# Patient Record
Sex: MALE | Race: WHITE | URBAN - METROPOLITAN AREA
[De-identification: names, ages, dates, MRNs, and addresses within clinical notes are randomized per-mention and may not be internally consistent; named-entity substitution may affect disease eponyms.]

---

## 2017-04-10 ENCOUNTER — OFFICE VISIT (OUTPATIENT)
Dept: INTERNAL MEDICINE CLINIC | Facility: CLINIC | Age: 52
End: 2017-04-10

## 2017-04-10 VITALS
BODY MASS INDEX: 24.77 KG/M2 | DIASTOLIC BLOOD PRESSURE: 84 MMHG | SYSTOLIC BLOOD PRESSURE: 122 MMHG | HEART RATE: 73 BPM | WEIGHT: 193 LBS | HEIGHT: 74 IN | TEMPERATURE: 97 F

## 2017-04-10 DIAGNOSIS — Z12.11 SCREENING FOR COLON CANCER: ICD-10-CM

## 2017-04-10 DIAGNOSIS — Z76.89 ESTABLISHING CARE WITH NEW DOCTOR, ENCOUNTER FOR: Primary | ICD-10-CM

## 2017-04-10 PROCEDURE — 99214 OFFICE O/P EST MOD 30 MIN: CPT | Performed by: INTERNAL MEDICINE

## 2017-04-10 PROCEDURE — 99212 OFFICE O/P EST SF 10 MIN: CPT | Performed by: INTERNAL MEDICINE

## 2021-07-12 NOTE — PROGRESS NOTES
HPI:    Patient ID: Romy Benítez is a 45year old male. HPI He came in today to establish care with new physician. He recently came back from [place] , and his wife was diagnosed with e coli , he wanted to be checked. He denies any diarrhea, no nausea • Lipid screening 02-          Past Surgical History    INNER EAR SURGERY PROC UNLISTED        Family History   Problem Relation Age of Onset   • Hypertension Mother    • Thyroid Disorder Mother    • Cancer Brother       Social History:   Smoking distension and no mass. There is no hepatosplenomegaly. There is no tenderness. There is no rigidity, no rebound, no guarding and no CVA tenderness. Musculoskeletal: Normal range of motion. He exhibits no edema.    Lymphadenopathy:     He has no cervical 1.64

## (undated) NOTE — MR AVS SNAPSHOT
[address] [facility] [address]  [phone]               Thank you for choosing us for your health care visit with Kelsey Harmon MD.  We are glad to serve you and happy to provide you with this summary of your visi [facility] L[facility] [[id number] CUSTOM]  Order #:  [id number]         **REFERRAL REQUEST**    Your physician has referred you to a specialist.  Your physician or the clinic staff will provide you with the phone number you should call to schedule your appointmen Instructions and Information about Your Health      Aerobic Exercise for a Healthy Heart  Exercise is a lot more than an energy booster and a stress reliever.  It also strengthens your heart muscle, lowers your blood pressure and cholesterol, and burns sonia This list is accurate as of: 4/10/17 11:53 AM.  Always use your most recent med list.                aspirin 81 MG Tabs   Take 81 mg by mouth daily.                    Results of Recent Testing       MyChart     Visit MyChart  You can access your MyChar